# Patient Record
Sex: FEMALE | Race: BLACK OR AFRICAN AMERICAN | NOT HISPANIC OR LATINO | URBAN - METROPOLITAN AREA
[De-identification: names, ages, dates, MRNs, and addresses within clinical notes are randomized per-mention and may not be internally consistent; named-entity substitution may affect disease eponyms.]

---

## 2019-01-01 ENCOUNTER — EMERGENCY (EMERGENCY)
Facility: HOSPITAL | Age: 44
LOS: 0 days | Discharge: HOME | End: 2019-01-02
Attending: EMERGENCY MEDICINE
Payer: OTHER MISCELLANEOUS

## 2019-01-01 VITALS
DIASTOLIC BLOOD PRESSURE: 85 MMHG | OXYGEN SATURATION: 100 % | SYSTOLIC BLOOD PRESSURE: 126 MMHG | RESPIRATION RATE: 16 BRPM | TEMPERATURE: 99 F | HEIGHT: 68 IN | WEIGHT: 212.08 LBS | HEART RATE: 71 BPM

## 2019-01-01 DIAGNOSIS — B19.10 UNSPECIFIED VIRAL HEPATITIS B WITHOUT HEPATIC COMA: ICD-10-CM

## 2019-01-01 DIAGNOSIS — S01.112A LACERATION WITHOUT FOREIGN BODY OF LEFT EYELID AND PERIOCULAR AREA, INITIAL ENCOUNTER: ICD-10-CM
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
## 2019-01-01 LAB — HCG SERPL-ACNC: <1 MIU/ML — SIGNIFICANT CHANGE UP

## 2019-01-01 PROCEDURE — 99284 EMERGENCY DEPT VISIT MOD MDM: CPT

## 2019-01-01 PROCEDURE — 76377 3D RENDER W/INTRP POSTPROCES: CPT | Mod: 26

## 2019-01-01 PROCEDURE — 70486 CT MAXILLOFACIAL W/O DYE: CPT | Mod: 26

## 2019-01-01 RX ORDER — HEPATITIS B IMMUNE GLOBULIN (HUMAN) 1560 [IU]/5ML
5.7 LIQUID INTRAMUSCULAR ONCE
Qty: 0 | Refills: 0 | Status: COMPLETED | OUTPATIENT
Start: 2019-01-01 | End: 2019-01-01

## 2019-01-01 RX ORDER — HEPATITIS B VIRUS VACCINE,RECB 20 MCG/ML
1 VIAL (ML) INTRAMUSCULAR ONCE
Qty: 0 | Refills: 0 | Status: COMPLETED | OUTPATIENT
Start: 2019-01-01 | End: 2019-01-01
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
## 2019-01-01 NOTE — ED PROVIDER NOTE - PHYSICAL EXAMINATION
Gen: Alert, NAD, well appearing  Head: NC, PERRL, EOMI, normal lids/conjunctiva, +mild periorbital tenderness, no swelling, +small superficial laceration to the left lower eyelid  ENT: normal hearing, patent oropharynx without erythema/exudate, uvula midline  Neck: +supple, no tenderness/meningismus/JVD, +Trachea midline  Pulm: Bilateral BS, normal resp effort, no wheeze/stridor/retractions  CV: RRR, no M/R/G, +dist pulses  Abd: soft, NT/ND, +BS, no palpable masses  Mskel: no edema/erythema/cyanosis  Skin: no rash, warm/dry  Neuro: AAOx3, no apparent sensory/motor deficits, coordination intact
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
## 2019-01-02 VITALS
RESPIRATION RATE: 17 BRPM | HEART RATE: 64 BPM | TEMPERATURE: 98 F | DIASTOLIC BLOOD PRESSURE: 94 MMHG | SYSTOLIC BLOOD PRESSURE: 151 MMHG | OXYGEN SATURATION: 96 %

## 2019-01-02 LAB
HBV SURFACE AB SER-ACNC: SIGNIFICANT CHANGE UP
HBV SURFACE AG SER-ACNC: SIGNIFICANT CHANGE UP
HIV 1 & 2 AB SERPL IA.RAPID: SIGNIFICANT CHANGE UP

## 2019-01-02 RX ADMIN — Medication 1 MILLILITER(S): at 00:58

## 2019-01-02 RX ADMIN — HEPATITIS B IMMUNE GLOBULIN (HUMAN) 5 MILLILITER(S): 1560 LIQUID INTRAMUSCULAR at 00:22

## 2022-08-12 NOTE — ED PROVIDER NOTE - NS ED MD DISPO DISCHARGE
"Chief Complaint  Follow-up - iron deficiency anemia     Subjective        Yoselyn Kenney presents to [facility]  History of Present Illness     No new health issues since last visit.   No new medications.   No new hospitalization.   Feels well.       Objective   Vital Signs:  /79   Pulse 92   Temp 97.8 °F (36.6 °C)   Wt 135 kg (297 lb)   SpO2 98%   BMI 49.42 kg/m²   Estimated body mass index is 49.42 kg/m² as calculated from the following:    Height as of 8/20/21: 165.1 cm (65\").    Weight as of this encounter: 135 kg (297 lb).          Physical Exam  Vitals and nursing note reviewed.   Constitutional:       Appearance: Normal appearance.   Neurological:      General: No focal deficit present.      Mental Status: She is alert and oriented to person, place, and time. Mental status is at baseline.   Psychiatric:         Mood and Affect: Mood normal.         Behavior: Behavior normal.         Thought Content: Thought content normal.        Result Review :  The following data was reviewed by: Dionicio Du MD on 08/12/2022:  Common labs    Common Labsle 12/9/21 4/11/22 8/8/22   WBC 6.66 4.84 4.80   Hemoglobin 13.4 14.1 14.4   Hematocrit 41.7 43.4 44.1   Platelets 267 268 240               CBC    CBC 12/9/21 4/11/22 8/8/22   WBC 6.66 4.84 4.80   RBC 4.63 4.86 5.03   Hemoglobin 13.4 14.1 14.4   Hematocrit 41.7 43.4 44.1   MCV 90.1 89.3 87.7   MCH 28.9 29.0 28.6   MCHC 32.1 32.5 32.7   RDW 13.8 13.6 13.1   Platelets 267 268 240           CBC w/diff    CBC w/Diff 12/9/21 4/11/22 8/8/22   WBC 6.66 4.84 4.80   RBC 4.63 4.86 5.03   Hemoglobin 13.4 14.1 14.4   Hematocrit 41.7 43.4 44.1   MCV 90.1 89.3 87.7   MCH 28.9 29.0 28.6   MCHC 32.1 32.5 32.7   RDW 13.8 13.6 13.1   Platelets 267 268 240   Neutrophil Rel % 57.2     Lymphocyte Rel % 29.6     Monocyte Rel % 8.9     Eosinophil Rel % 3.8     Basophil Rel % 0.5               Yoselyn Kenney reports a pain " score of 0.  Given her pain assessment as noted, treatment options were discussed and the following options were decided upon as a follow-up plan to address the patient's pain: continuation of current treatment plan for pain.    Patient screened negative for depression based on a PHQ-9 score of 0 on 8/12/2022.     Assessment and Plan   Diagnoses and all orders for this visit:    1. Iron deficiency anemia, unspecified iron deficiency anemia type (Primary)  -     CBC (No Diff); Future  -     Ferritin; Future  -     Iron Profile; Future    Chronic, stable.  Anemia has improved.  Iron saturation is stable.  Continue monitoring.  CBC, ferritin, iron profile in 6 months    2. History of gastric bypass    3. B12 deficiency    Chronic, stable.  Continue B12 supplements.           Follow Up   No follow-ups on file.  Patient was given instructions and counseling regarding her condition or for health maintenance advice. Please see specific information pulled into the AVS if appropriate.        Home

## 2022-10-14 NOTE — ED ADULT NURSE NOTE - NSSISCREENINGQ5_ED_A_ED
[[email or web address]_ALL_OB_FT:[id number]],[NS_DeliveryAssist1_OBGYN_ALL_OB_FT:[id number]],[NS_DeliveryRN_OBGYN_[email or web address]_FT:[id number]] No